# Patient Record
Sex: FEMALE | Race: BLACK OR AFRICAN AMERICAN | URBAN - METROPOLITAN AREA
[De-identification: names, ages, dates, MRNs, and addresses within clinical notes are randomized per-mention and may not be internally consistent; named-entity substitution may affect disease eponyms.]

---

## 2021-06-29 ENCOUNTER — OFFICE VISIT (OUTPATIENT)
Dept: URBAN - METROPOLITAN AREA CLINIC 54 | Facility: CLINIC | Age: 56
End: 2021-06-29
Payer: COMMERCIAL

## 2021-06-29 DIAGNOSIS — H40.023 OPEN ANGLE WITH BORDERLINE FINDINGS, HIGH RISK, BILATERAL: ICD-10-CM

## 2021-06-29 DIAGNOSIS — E11.3512 TYPE 2 DIAB WITH PROLIF DIAB RTNOP WITH MACULAR EDEMA, L EYE: Primary | ICD-10-CM

## 2021-06-29 DIAGNOSIS — E11.3591 TYPE 2 DIAB WITH PROLIF DIAB RTNOP WITHOUT MCLR EDEMA, R EYE: ICD-10-CM

## 2021-06-29 PROCEDURE — 92134 CPTRZ OPH DX IMG PST SGM RTA: CPT | Performed by: OPHTHALMOLOGY

## 2021-06-29 PROCEDURE — 92014 COMPRE OPH EXAM EST PT 1/>: CPT | Performed by: OPHTHALMOLOGY

## 2021-06-29 ASSESSMENT — INTRAOCULAR PRESSURE
OS: 27
OD: 26
OD: 28
OS: 28

## 2021-06-29 NOTE — IMPRESSION/PLAN
Impression: Type 2 diab with prolif diab rtnop with macular edema, l eye: W95.3317. OS. Left. s/p PPV, PRP x VH OS 2/1/17 Plan: Exam/OCT reveals regressed PDR with minimal DME OS. The DME today has improved since patient was last seen on 9/20. At this time, no further treatment is recommended. Emphasized importance of good blood sugar, blood pressure, and cholesterol control.   

Return in 1 year, OCT OU, re-eval Avastin OU

## 2021-06-29 NOTE — IMPRESSION/PLAN
Impression: Puckering of macula, left eye: H35.372. OS. Left. Plan: Exam/OCT reveals macular pucker formation OS. Recommend observation.

## 2021-06-29 NOTE — IMPRESSION/PLAN
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 OD. Right.
s/p PPV, PRP x VH OD 12/14/16 Plan: Exam/OCT reveals regressed PDR with no CSDME OD. Condition stable.

## 2021-06-29 NOTE — IMPRESSION/PLAN
Impression: Open angle with borderline findings, high risk, bilateral: H40.023. Plan: IOP elevated OU (TA 28 OD and 27 OS) and optic nerves appear suspicious. Discussed risk for vision loss due to glaucoma.   Recommend urgent evaluation with a glaucoma specialist.

## 2021-06-30 ENCOUNTER — OFFICE VISIT (OUTPATIENT)
Dept: URBAN - METROPOLITAN AREA CLINIC 56 | Facility: CLINIC | Age: 56
End: 2021-06-30
Payer: COMMERCIAL

## 2021-06-30 DIAGNOSIS — H40.053 OCULAR HYPERTENSION, BILATERAL: Primary | ICD-10-CM

## 2021-06-30 DIAGNOSIS — H35.372 PUCKERING OF MACULA, LEFT EYE: ICD-10-CM

## 2021-06-30 DIAGNOSIS — Z96.1 PRESENCE OF INTRAOCULAR LENS: ICD-10-CM

## 2021-06-30 PROCEDURE — 92250 FUNDUS PHOTOGRAPHY W/I&R: CPT | Performed by: OPTOMETRIST

## 2021-06-30 PROCEDURE — 99204 OFFICE O/P NEW MOD 45 MIN: CPT | Performed by: OPTOMETRIST

## 2021-06-30 PROCEDURE — 92133 CPTRZD OPH DX IMG PST SGM ON: CPT | Performed by: OPTOMETRIST

## 2021-06-30 RX ORDER — TIMOLOL MALEATE 6.8 MG/ML
0.5 % SOLUTION/ DROPS OPHTHALMIC
Qty: 5 | Refills: 3 | Status: ACTIVE
Start: 2021-06-30

## 2021-06-30 ASSESSMENT — INTRAOCULAR PRESSURE
OD: 25
OS: 21

## 2021-06-30 NOTE — IMPRESSION/PLAN
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 OD. Right.
s/p PPV, PRP x VH OD 12/14/16 Plan: Exam/OCT reveals regressed PDR with no CSDME OD. Condition stable. 
* Continue care with Dr John Claire*

## 2021-06-30 NOTE — IMPRESSION/PLAN
Impression: Presence of intraocular lens: Z96.1. Plan: Well positioned ([facility] Doctor S/p YAG OU elsewhere

## 2021-12-07 NOTE — IMPRESSION/PLAN
3rd and final recall reminder letter mailed out to patient. Impression: Type 2 diab with prolif diab rtnop with macular edema, l eye: D93.0157. OS. Left. s/p PPV, PRP x VH OS 2/1/17 Plan: Exam/OCT reveals regressed PDR with minimal DME OS. The DME today has improved since patient was last seen on 9/20. At this time, no further treatment is recommended. Emphasized importance of good blood sugar, blood pressure, and cholesterol control.   
*Continue care with Dr Hank De La Garza*

## 2023-02-06 ENCOUNTER — OFFICE VISIT (OUTPATIENT)
Dept: URBAN - METROPOLITAN AREA CLINIC 54 | Facility: CLINIC | Age: 58
End: 2023-02-06
Payer: COMMERCIAL

## 2023-02-06 DIAGNOSIS — E11.3512 TYPE 2 DIAB WITH PROLIF DIAB RTNOP WITH MACULAR EDEMA, L EYE: Primary | ICD-10-CM

## 2023-02-06 DIAGNOSIS — E11.3591 TYPE 2 DIAB WITH PROLIF DIAB RTNOP WITHOUT MCLR EDEMA, R EYE: ICD-10-CM

## 2023-02-06 DIAGNOSIS — Z96.1 PRESENCE OF PSEUDOPHAKIA: ICD-10-CM

## 2023-02-06 DIAGNOSIS — H35.372 PUCKERING OF MACULA, LEFT EYE: ICD-10-CM

## 2023-02-06 PROCEDURE — 92134 CPTRZ OPH DX IMG PST SGM RTA: CPT | Performed by: OPHTHALMOLOGY

## 2023-02-06 PROCEDURE — 92014 COMPRE OPH EXAM EST PT 1/>: CPT | Performed by: OPHTHALMOLOGY

## 2023-02-06 ASSESSMENT — INTRAOCULAR PRESSURE
OD: 20
OS: 18

## 2023-02-06 NOTE — IMPRESSION/PLAN
Impression: Type 2 diab with prolif diab rtnop with macular edema, l eye: V99.6726. OS. Left. s/p PPV, PRP x VH OS 2/1/17 Plan: Exam/OCT reveals regressed PDR with minimal DME OS. The DME today has improved since patient was last seen on 9/20. At this time, no further treatment is recommended. Emphasized importance of good blood sugar, blood pressure, and cholesterol control.   

Return in 1 year, OCT OU, re-eval Avastin OU